# Patient Record
Sex: FEMALE | Race: WHITE | Employment: FULL TIME | URBAN - METROPOLITAN AREA
[De-identification: names, ages, dates, MRNs, and addresses within clinical notes are randomized per-mention and may not be internally consistent; named-entity substitution may affect disease eponyms.]

---

## 2023-05-02 ENCOUNTER — HOSPITAL ENCOUNTER (EMERGENCY)
Age: 63
Discharge: HOME | End: 2023-05-02
Attending: EMERGENCY MEDICINE
Payer: OTHER MISCELLANEOUS

## 2023-05-02 ENCOUNTER — APPOINTMENT (OUTPATIENT)
Dept: GENERAL RADIOLOGY | Age: 63
End: 2023-05-02
Payer: OTHER MISCELLANEOUS

## 2023-05-02 ENCOUNTER — APPOINTMENT (OUTPATIENT)
Dept: GENERAL RADIOLOGY | Age: 63
End: 2023-05-02
Attending: NURSE PRACTITIONER
Payer: OTHER MISCELLANEOUS

## 2023-05-02 VITALS
HEART RATE: 79 BPM | BODY MASS INDEX: 27.31 KG/M2 | RESPIRATION RATE: 18 BRPM | WEIGHT: 160 LBS | SYSTOLIC BLOOD PRESSURE: 127 MMHG | HEIGHT: 64 IN | OXYGEN SATURATION: 98 % | DIASTOLIC BLOOD PRESSURE: 81 MMHG | TEMPERATURE: 98 F

## 2023-05-02 DIAGNOSIS — S50.01XA CONTUSION OF RIGHT ELBOW, INITIAL ENCOUNTER: Primary | ICD-10-CM

## 2023-05-02 PROCEDURE — 99284 EMERGENCY DEPT VISIT MOD MDM: CPT

## 2023-05-02 PROCEDURE — 73080 X-RAY EXAM OF ELBOW: CPT | Performed by: NURSE PRACTITIONER

## 2023-05-02 RX ORDER — LIDOCAINE 50 MG/G
1 PATCH TOPICAL EVERY 24 HOURS
Qty: 5 PATCH | Refills: 0 | Status: SHIPPED | OUTPATIENT
Start: 2023-05-02 | End: 2023-05-07

## 2023-05-02 NOTE — ED INITIAL ASSESSMENT (HPI)
Pt was at work and walked on a wet carpet slipped hit her r elbow on a door having persistent pain from yesterday.  Pt works at [employer].

## 2023-05-02 NOTE — ED QUICK NOTES
PT awaiting for after hours staff to come. Discharge instructions given and verbalized of understanding.

## 2023-05-03 ENCOUNTER — OFFICE VISIT (OUTPATIENT)
Dept: OCCUPATIONAL MEDICINE | Age: 63
End: 2023-05-03
Attending: PHYSICIAN ASSISTANT

## 2023-05-10 ENCOUNTER — OFFICE VISIT (OUTPATIENT)
Dept: OCCUPATIONAL MEDICINE | Age: 63
End: 2023-05-10
Attending: FAMILY MEDICINE

## (undated) NOTE — LETTER
Date & Time: 5/2/2023, 9:13 AM  Patient: Doyle Friend  Encounter Provider(s):    MD Liliana Urban APRN       To Whom It May Concern:    Dinorah Vogel was seen and treated in our department on 5/2/2023. She can return to work with these limitations: limit use of right upper extremity until cleared by Occupational Health .     If you have any questions or concerns, please do not hesitate to call.        _____________________________  Physician/APC Signature